# Patient Record
Sex: FEMALE | Race: AMERICAN INDIAN OR ALASKA NATIVE
[De-identification: names, ages, dates, MRNs, and addresses within clinical notes are randomized per-mention and may not be internally consistent; named-entity substitution may affect disease eponyms.]

---

## 2018-03-28 NOTE — HISTORY AND PHYSICAL REPORT
History of Present Illness


Date of examination: 18


History of present illness: 


Patient is admitted for repeat  section


Menstrual History 


Regularity: irregular


Duration: 7-8


LMP: 2017


LMP reliability: definite


LMP character: lighter


Pregnancy test type: urine test   Date: 2017


BC at conception: none


Planned pregnancy? no





EDC Calculations 


LMP: 2018





EDC Confirmation:  2018








Past Pregnancy History 


   :      3


   Term Births:   1


   Premature Births:   0


   Living Children:   1


   Para:      1


   Prev :   1


   Aborta:      1


   Spont. Ab:      1





Pregnancy # 1


   Delivery date:     


   Weeks Gestation:   38


    labor:     no


   Delivery type:     


   Anesthesia type:     epidural


   Delivery location:     [facility]


   Infant Sex:      Female


   Birth weight:   5#7


   Comments:      Late PNC, IUGR, GDM - failed IOL after 24h








Past Medical History:


   Negative Past Medical History





Past Surgical History:


    





Past Medical History 


Surgery (Non-gyn):  





Abnormal PAP: negative


RICHARD Exposure: negative


Infertility: negative


Uterine Anomaly: negative


Uterine Surgery (not C/S): negative


Other Gynecologic Problems: negative





Family Hx: DM - father's side of the familt


HTN - Mother's family


no known hx cancer





Social Hx: Single


no ETOH/smoking/drugs


unemployed





Infection History 


Hx of STD: none


HIV Risk Eval: low risk


Hepatitis B Risk Eval: low risk


Personal hx. of genital herpes: yes


Partner hx. of genital herpes: no


Rash, Viral, or Febrile illness since last LMP? no


Varicella/Chicken Pox Status: Immunized





Genetic History 


 Congenital Heart Defect:


    Mom: no  Dad: no


Canavan Disease:


    Mom: no  Dad: no


Thalassemia


    Mom: no  Dad: no


Neural Tube Defect


    Mom: no  Dad: no


Down's Syndrome


    Mom: no  Dad: no


Luther-Sachs


    Mom: no  Dad: no


Sickle Cell Disease/Trait


    Mom: no  Dad: no


Hemophilia


    Mom: no  Dad: no


Muscular Dystrophy


    Mom: no  Dad: no


Cystic Fibrosis


    Mom: no  Dad: no


Scott Chorea


    Mom: no  Dad: no


Mental Retardation


    Mom: no  Dad: no


Fragile X


    Mom: no  Dad: no


Other Genetic/Chromosomal Disorder


    Mom: no  Dad: no


Child w/other birth defect


    Mom: no  Dad: no





Enviromental Exposures 


Xray Exposure: no


Medication, drug, or alcohol use since LMP: no


Chemical/Other Exposure: no


Exposure to Cat Liter: no


Hx of Parvovirus (Fifth Disease): no


Occupational Exposure to Children: none


Current Allergies (reviewed today):


No known allergies











Past History


Past Medical History: other (See HPI)


Past Surgical History: other (See HPI)


GYN History: other (See HPI)


Family/Genetic History: other (See HPI)


Social history: other (See HPI)





- Obstetrical History


Expected Date of Delivery: 18


Actual Gestation: 39 Week(s) 0 Day(s) 


: 3


Hx # Term Pregnancies: 1


Number of  Pregnancies: 0


Spontaneous Abortions: 1


Induced : 0


Number of Living Children: 1





Medications and Allergies


 Allergies











Allergy/AdvReac Type Severity Reaction Status Date / Time


 


No Known Allergies Allergy   Verified 18 09:19














- Physical Exam


Breasts: Positive: deferred


Cardiovascular: Regular rate


Lungs: Positive: Normal air movement


Abdomen: Positive: normal appearance, normal bowel sounds


Genitourinary (Female): Positive: normal external genitalia





Results


All other labs normal.








Assessment and Plan





- Patient Problems


(1) Maternal care due to low transverse uterine scar from previous  

delivery


Current Visit: No   Status: Acute   


Plan to address problem: 


Discuss the risks of the surgery including infection, bleeding possibly heavy 

enough to require a blood transfusion, possible damage to bowel, bladder or 

ureter.  Her questions were answered. Patient understands and desires to 

proceed 








(2) Pregnancy with 39 completed weeks gestation


Current Visit: No   Status: Acute   





(3) Herpes, genital


Current Visit: No   Status: Chronic   


Qualifiers: 


   Herpes simplex infection site: unspecified   Qualified Code(s): A60.00 - 

Herpesviral infection of urogenital system, unspecified

## 2018-03-29 ENCOUNTER — HOSPITAL ENCOUNTER (INPATIENT)
Dept: HOSPITAL 5 - APU | Age: 23
LOS: 2 days | Discharge: HOME | End: 2018-03-31
Attending: OBSTETRICS & GYNECOLOGY | Admitting: OBSTETRICS & GYNECOLOGY
Payer: COMMERCIAL

## 2018-03-29 DIAGNOSIS — O34.211: Primary | ICD-10-CM

## 2018-03-29 DIAGNOSIS — Z3A.39: ICD-10-CM

## 2018-03-29 DIAGNOSIS — A60.00: ICD-10-CM

## 2018-03-29 LAB
BASOPHILS # (AUTO): 0 K/MM3 (ref 0–0.1)
BASOPHILS NFR BLD AUTO: 0.3 % (ref 0–1.8)
EOSINOPHIL # BLD AUTO: 0.1 K/MM3 (ref 0–0.4)
EOSINOPHIL NFR BLD AUTO: 0.5 % (ref 0–4.3)
HCT VFR BLD CALC: 33.5 % (ref 30.3–42.9)
HGB BLD-MCNC: 11.1 GM/DL (ref 10.1–14.3)
LYMPHOCYTES # BLD AUTO: 2.7 K/MM3 (ref 1.2–5.4)
LYMPHOCYTES NFR BLD AUTO: 23.7 % (ref 13.4–35)
MCH RBC QN AUTO: 29 PG (ref 28–32)
MCHC RBC AUTO-ENTMCNC: 33 % (ref 30–34)
MCV RBC AUTO: 87 FL (ref 79–97)
MONOCYTES # (AUTO): 1.1 K/MM3 (ref 0–0.8)
MONOCYTES % (AUTO): 9.4 % (ref 0–7.3)
PLATELET # BLD: 200 K/MM3 (ref 140–440)
RBC # BLD AUTO: 3.85 M/MM3 (ref 3.65–5.03)

## 2018-03-29 PROCEDURE — 90471 IMMUNIZATION ADMIN: CPT

## 2018-03-29 PROCEDURE — G0463 HOSPITAL OUTPT CLINIC VISIT: HCPCS

## 2018-03-29 PROCEDURE — 90715 TDAP VACCINE 7 YRS/> IM: CPT

## 2018-03-29 PROCEDURE — C1765 ADHESION BARRIER: HCPCS

## 2018-03-29 PROCEDURE — 85018 HEMOGLOBIN: CPT

## 2018-03-29 PROCEDURE — 86900 BLOOD TYPING SEROLOGIC ABO: CPT

## 2018-03-29 PROCEDURE — 36415 COLL VENOUS BLD VENIPUNCTURE: CPT

## 2018-03-29 PROCEDURE — A6250 SKIN SEAL PROTECT MOISTURIZR: HCPCS

## 2018-03-29 PROCEDURE — 86850 RBC ANTIBODY SCREEN: CPT

## 2018-03-29 PROCEDURE — 85014 HEMATOCRIT: CPT

## 2018-03-29 PROCEDURE — 85025 COMPLETE CBC W/AUTO DIFF WBC: CPT

## 2018-03-29 PROCEDURE — 99211 OFF/OP EST MAY X REQ PHY/QHP: CPT

## 2018-03-29 PROCEDURE — 86901 BLOOD TYPING SEROLOGIC RH(D): CPT

## 2018-03-29 RX ADMIN — DEXTROSE, SODIUM CHLORIDE, SODIUM LACTATE, POTASSIUM CHLORIDE, AND CALCIUM CHLORIDE SCH MLS/HR: 5; .6; .31; .03; .02 INJECTION, SOLUTION INTRAVENOUS at 17:03

## 2018-03-29 RX ADMIN — CEFAZOLIN SCH MLS/MIN: 10 INJECTION, POWDER, FOR SOLUTION INTRAVENOUS at 17:03

## 2018-03-29 RX ADMIN — DEXTROSE, SODIUM CHLORIDE, SODIUM LACTATE, POTASSIUM CHLORIDE, AND CALCIUM CHLORIDE SCH MLS/HR: 5; .6; .31; .03; .02 INJECTION, SOLUTION INTRAVENOUS at 23:28

## 2018-03-29 RX ADMIN — KETOROLAC TROMETHAMINE SCH MG: 30 INJECTION, SOLUTION INTRAMUSCULAR at 15:13

## 2018-03-29 RX ADMIN — METHYLERGONOVINE MALEATE SCH: 0.2 TABLET ORAL at 21:12

## 2018-03-29 RX ADMIN — KETOROLAC TROMETHAMINE SCH MG: 30 INJECTION, SOLUTION INTRAMUSCULAR at 21:06

## 2018-03-29 RX ADMIN — METHYLERGONOVINE MALEATE SCH: 0.2 TABLET ORAL at 15:31

## 2018-03-29 NOTE — OPERATIVE REPORT
Operative Report


Operative Report: 


Date of procedure: 2018





Pre-operative diagnosis: Intrauterine pregnancy at 39 weeks with previous 

 section





Post-operative diagnosis: Same





Procedure name(s): Repeat low transverse  section





Surgeon: Johnie Patel MD





Assistant: Farrah Orozco CST





Anesthesia: Spinal





EBL: 600





Complications: None





Findings: Normal uterus with thin lower uterine segment normal tubes and 

ovaries bilaterally.  Female infant weight 6 lbs. 12 oz. Apgars 8 at 1 minute 

and 9 at 5 minutes





Specimen(s): None





Procedure: The patient was brought to the operating room.  A spinal was placed 

without any complications.  She was then placed in left lateral tilt.  Prepped 

and draped in the usual sterile manner.  After testing for adequate anesthesia 

level, a Pfannenstiel incision was made through her previous scar.  This 

incision was taken down to the fascia.  The fascia was then nicked in the 

midline.  This incision was extended out laterally with Oliveira scissors.  The 

fascia was then  sharply and bluntly from the underlying rectus 

muscles.  The rectus muscles were bluntly and sharply .  The 

peritoneum was then entered with the 's fingers.  This incision was 

spread vertically with care not to damage the bladder below.  The bladder flap 

was then formed sharply and bluntly with Metzenbaum scissors.  The Rubén self-

retaining tractor was then placed without any difficulty.  A transverse 

incision was made in lower uterine segment.  This incision was extended 

laterally with the operators fingers.  The amniotic sac was then entered 

bluntly with the 's fingers.  The infant was delivered from the vertex 

position.  Bulb suction on the mother's abdomen.  Cord was double clamped and 

cut.  The infant was then passed to the nursery personnel who were in 

attendance.  The above Apgar scores were given by the nursery personnel.  The 

placenta was then bluntly removed.  The uterus was then externalized and wiped 

clean the remaining products.  The uterine incision was closed in layers.  The 

first incision was closed in a locking manner using 0 Vicryl.  This was 

followed by imbricating stitch also with 0 Vicryl.  This closure was 

hemostatic.  The bladder flap was copiously irrigated and found to be 

hemostatic. The pelvis was copiously irrigated and found to be hemostatic.  The 

uterus was then placed back to the patient's abdomen.  The retractors were 

removed.  The rectus muscles were inspected and found to be hemostatic.  The 

fascia was then closed in a running manner using 0 Vicryl.  This incision was 

hemostatic irrigation Bovie.  The skin was reapproximated with 4-0 Vicryl 

subcuticularly.  The patient tolerated procedure well.  Her urine was clear.  

The infant was admitted to the well baby nursery.  The patient was accompanied 

to recovery room in good condition.  Instrument count correct 3

## 2018-03-29 NOTE — ANESTHESIA CONSULTATION
Anesthesia Consult and Med Hx


Date of service: 03/29/18





- Airway


Anesthetic Teeth Evaluation: Good


ROM Head & Neck: Adequate


Mental/Hyoid Distance: Adequate


Mallampati Class: Class II


Intubation Access Assessment: Probably Good





- Pre-Operative Health Status


ASA Pre-Surgery Classification: ASA2


Proposed Anesthetic Plan: Epidural, Spinal





- Pulmonary


Hx Asthma: No





- Cardiovascular System


Hx Hypertension: No





- Central Nervous System


Hx Seizures: Yes (x1 as a child)


Hx Psychiatric Problems: No





- Endocrine


Hx Renal Disease: No


Hx Hypothyroidism: No


Hx Hyperthyroidism: No





- Hematic


Hx Anemia: No


Hx Sickle Cell Disease: No





- Other Systems


Hx Alcohol Use: No

## 2018-03-30 LAB
HCT VFR BLD CALC: 31.3 % (ref 30.3–42.9)
HGB BLD-MCNC: 10.1 GM/DL (ref 10.1–14.3)

## 2018-03-30 RX ADMIN — KETOROLAC TROMETHAMINE SCH MG: 30 INJECTION, SOLUTION INTRAMUSCULAR at 03:51

## 2018-03-30 RX ADMIN — DEXTROSE, SODIUM CHLORIDE, SODIUM LACTATE, POTASSIUM CHLORIDE, AND CALCIUM CHLORIDE SCH MLS/HR: 5; .6; .31; .03; .02 INJECTION, SOLUTION INTRAVENOUS at 05:52

## 2018-03-30 RX ADMIN — IBUPROFEN PRN MG: 800 TABLET, FILM COATED ORAL at 10:15

## 2018-03-30 RX ADMIN — HYDROCODONE BITARTRATE AND ACETAMINOPHEN PRN EACH: 5; 325 TABLET ORAL at 20:10

## 2018-03-30 RX ADMIN — FERROUS SULFATE TAB 325 MG (65 MG ELEMENTAL FE) SCH MG: 325 (65 FE) TAB at 10:15

## 2018-03-30 RX ADMIN — METHYLERGONOVINE MALEATE SCH: 0.2 TABLET ORAL at 05:57

## 2018-03-30 RX ADMIN — Medication SCH EACH: at 10:15

## 2018-03-30 RX ADMIN — CEFAZOLIN SCH MLS/MIN: 10 INJECTION, POWDER, FOR SOLUTION INTRAVENOUS at 02:24

## 2018-03-30 RX ADMIN — HYDROCODONE BITARTRATE AND ACETAMINOPHEN PRN EACH: 5; 325 TABLET ORAL at 15:09

## 2018-03-30 NOTE — PROGRESS NOTE
Assessment and Plan


 patient doing well, ambulating up in room caring for infant. Breastfeeding, 

lochia scant, dressing dry and intact - rn to remove during AM care. VSSAF, H&H 

10.1/31.3. Continue postop pathway.








- Patient Problems


(1)  delivery delivered


Current Visit: Yes   Status: Acute   





Subjective





- Subjective


Date of service: 18


Principal diagnosis: postop day #1 s/p repeat c/s


Patient reports: appetite normal, voiding normally, pain well controlled, flatus

, ambulating normally, no dizzy ambulation, no nauseated


[place]: doing well, nursing well





Objective





- Vital Signs


Latest vital signs: 


 Vital Signs











  Temp Pulse Resp BP BP Pulse Ox


 


 18 04:05  98.0 F  63  20   118/63 


 


 18 00:00  97.7 F  65  20   114/65 


 


 18 21:30  98.2 F  68  20   115/67 


 


 18 16:09  98.3 F  66  20   112/61  97


 


 18 14:21  97.5 F L  77  20   112/78  100


 


 18 13:38   82  14  127/75   100


 


 18 13:23   80  16  123/70   100


 


 18 13:08   80  16  127/75   100


 


 18 12:53   93 H  11 L  122/73   100


 


 18 12:48   89  14  127/74   100


 


 18 12:43  97.5 F L  94 H  12  126/69   100


 


 18 12:39  97.5 F L  99 H  16  108/66   100


 


 18 11:06   107 H     100


 


 18 11:01   106 H     100


 


 18 10:56   106 H     100


 


 18 10:51   107 H     100


 


 18 10:46   105 H     100


 


 18 10:41   107 H     100


 


 18 10:36   101 H     100


 


 18 10:31   98 H     100


 


 18 10:26   90     100


 


 18 10:21   99 H     99


 


 18 10:10   99 H     99


 


 18 10:05   103 H     98


 


 18 10:00   101 H     99


 


 18 09:55   91 H     98


 


 18 09:50   95 H     98


 


 18 09:45   87     98


 


 18 09:40   102 H     99


 


 18 09:35   104 H     100


 


 18 09:33   96 H   116/71  


 


 18 09:29  98.7 F  107 H  20   116/71  100








 Intake and Output











 18





 23:59 07:59 15:59


 


Intake Total 531.878 1701 


 


Output Total 350 600 


 


Balance 472.083 560 


 


Intake:   


 


  .083 800 


 


    D5lr 1,000 ml @ 125 mls/ 802.083 800 





    hr IV AS DIRECT JOHNATHAN Rx#:   





    [id number]   


 


  Oral  360 


 


  Intake, Free Water 20  


 


Output:   


 


  Urine 350 600 


 


    Indwelling Catheter 350 600 


 


Other:   


 


  Total, Intake Amount  120 


 


  Total, Output Amount 350 600 


 


  # Voids   


 


    Void  1 














- Exam


Breasts: Present: normal, breastfeeding


Cardiovascular: Present: Regular rate


Lungs: Present: Clear to auscultation, Normal air movement


Abdomen: Present: normal appearance, soft


Vulva: both: normal


Uterus: Present: normal, firm, fundal height at umbilicus


Extremities: Present: normal


Deep Tendon Reflex Grade: Normal +2


Incision: Present: normal, dry, dressed





- Labs


Labs: 


 Abnormal lab results











  18 Range/Units





  10:20 


 


WBC  11.5 H  (4.5-11.0)  K/mm3


 


Mono % (Auto)  9.4 H  (0.0-7.3)  %


 


Mono #  1.1 H  (0.0-0.8)  K/mm3

## 2018-03-31 VITALS — DIASTOLIC BLOOD PRESSURE: 76 MMHG | SYSTOLIC BLOOD PRESSURE: 111 MMHG

## 2018-03-31 RX ADMIN — HYDROCODONE BITARTRATE AND ACETAMINOPHEN PRN EACH: 5; 325 TABLET ORAL at 08:35

## 2018-03-31 RX ADMIN — HYDROCODONE BITARTRATE AND ACETAMINOPHEN PRN EACH: 5; 325 TABLET ORAL at 03:27

## 2018-03-31 RX ADMIN — Medication SCH EACH: at 10:38

## 2018-03-31 RX ADMIN — IBUPROFEN PRN MG: 800 TABLET, FILM COATED ORAL at 03:28

## 2018-03-31 RX ADMIN — FERROUS SULFATE TAB 325 MG (65 MG ELEMENTAL FE) SCH MG: 325 (65 FE) TAB at 10:38

## 2018-03-31 NOTE — DISCHARGE SUMMARY
Providers





- Providers


Date of Admission: 


18 08:57





Date of discharge: 18


Attending physician: 


IVAN JOLLY





 





18 12:41


Consult to Lactation Consultant [CONS] Routine 


   Reason For Exam: 











Primary care physician: 


ELZBIETA ENRIQUEZ








Hospitalization


Reason for admission:  section


Delivery: 


Procedure:  section


Incision: normal, dry, intact


Postpartum complications: none


Discharge diagnosis: IUP at term delivered


 baby: female


Hospital course: 


Patient was admitted and underwent above procedure without complications.  Her 

post operative course was benign she was afebrile throughout.  Patient 

postoperative day 1 hematocrit was in an acceptable range.  Patient had no 

orthostatic symptoms.  Patient was tolerating regular diet and voiding without 

difficulty at time of discharge.  Patient incision was healing well without 

evidence of infection.  Patient desires discharge today





Condition at discharge: Good


Disposition: DC-01 TO HOME OR SELFCARE





- Discharge Diagnoses


(1) Maternal care due to low transverse uterine scar from previous  

delivery


Status: Acute   





(2) Pregnancy with 39 completed weeks gestation


Status: Acute   





(3) Herpes, genital


Status: Chronic   


Qualifiers: 


   Herpes simplex infection site: unspecified   Qualified Code(s): A60.00 - 

Herpesviral infection of urogenital system, unspecified   





Plan





- Discharge Medications


Prescriptions: 


Ferrous Sulfate [Feosol 325 MG tab] 325 mg PO BID #60 tablet


Ibuprofen [Motrin 800 MG tab] 800 mg PO Q6H PRN #30 tablet


 PRN Reason: Pain


oxyCODONE /ACETAMINOPHEN [Percocet 5/325 mg] 1 - 2 tab PO Q4H PRN #30 tablet


 PRN Reason: Pain, Moderate





- Provider Discharge Summary


Additional instructions: 


[]  Smoking cessation referral if applicable(refer to patient education folder 

for contact #)


[]  Refer to [facility] Booklet








Call your doctor immediately for:


* Fever > 100.5


* Heavy vaginal bleeding ( >1 pad per hour)


* Severe persistent headache


* Shortness of breath


* Reddened, hot, painful area to leg or breast


* Drainage or odor from incision.





* Keep incision clean and dry at all times and follow doctor's instructions 

regarding bathing/showering











- Follow up plan


Follow up: 


ELZBIETA ENRIQUEZ MD [Primary Care Provider] - 7 Days